# Patient Record
(demographics unavailable — no encounter records)

---

## 2025-04-14 NOTE — HISTORY OF PRESENT ILLNESS
[LMP unknown] : LMP unknown [postmenopausal] : postmenopausal [Y] : Positive pregnancy history [Mammogramdate] : 08/28/24 [TextBox_19] : BR2 [BreastSonogramDate] : 08/14/23 [TextBox_25] : BR2 [PapSmeardate] : 04/11/24 [TextBox_31] : NEG [BoneDensityDate] : 8/16/22 [TextBox_37] : NORM [ColonoscopyDate] : 2020 [TextBox_43] : PER PT [HPVDate] : 4/11/24 [TextBox_78] : NEG [PGHxTotal] : 4 [[facility]xFul[facility]] : 1 [[facility] Center[facility]] : 1 [PGHxABInduced] : 1 [PGHxABSpont] : 2 [unknown] : Patient is unsure of the date of her LMP [Menarche Age: ____] : age at menarche was [unfilled] [Currently Active] : currently active